# Patient Record
Sex: MALE | Race: OTHER | NOT HISPANIC OR LATINO
[De-identification: names, ages, dates, MRNs, and addresses within clinical notes are randomized per-mention and may not be internally consistent; named-entity substitution may affect disease eponyms.]

---

## 2017-01-25 ENCOUNTER — APPOINTMENT (OUTPATIENT)
Dept: NEUROLOGY | Facility: CLINIC | Age: 42
End: 2017-01-25

## 2017-01-25 VITALS
SYSTOLIC BLOOD PRESSURE: 127 MMHG | HEIGHT: 66 IN | WEIGHT: 182 LBS | BODY MASS INDEX: 29.25 KG/M2 | DIASTOLIC BLOOD PRESSURE: 79 MMHG

## 2017-01-25 DIAGNOSIS — Z80.42 FAMILY HISTORY OF MALIGNANT NEOPLASM OF PROSTATE: ICD-10-CM

## 2017-01-25 DIAGNOSIS — F17.200 NICOTINE DEPENDENCE, UNSPECIFIED, UNCOMPLICATED: ICD-10-CM

## 2017-01-25 DIAGNOSIS — H53.2 DIPLOPIA: ICD-10-CM

## 2017-01-25 DIAGNOSIS — H02.403 UNSPECIFIED PTOSIS OF BILATERAL EYELIDS: ICD-10-CM

## 2017-01-25 DIAGNOSIS — Z80.8 FAMILY HISTORY OF MALIGNANT NEOPLASM OF OTHER ORGANS OR SYSTEMS: ICD-10-CM

## 2017-01-26 PROBLEM — F17.200 CURRENT EVERY DAY SMOKER: Status: ACTIVE | Noted: 2017-01-26

## 2017-01-26 PROBLEM — Z80.42 FAMILY HISTORY OF MALIGNANT NEOPLASM OF PROSTATE: Status: ACTIVE | Noted: 2017-01-26

## 2017-01-26 PROBLEM — Z80.8 FAMILY HISTORY OF MALIGNANT NEOPLASM OF THYROID: Status: ACTIVE | Noted: 2017-01-26

## 2017-02-01 ENCOUNTER — APPOINTMENT (OUTPATIENT)
Dept: NEUROLOGY | Facility: CLINIC | Age: 42
End: 2017-02-01

## 2017-02-20 ENCOUNTER — FORM ENCOUNTER (OUTPATIENT)
Age: 42
End: 2017-02-20

## 2017-02-21 ENCOUNTER — OUTPATIENT (OUTPATIENT)
Dept: OUTPATIENT SERVICES | Facility: HOSPITAL | Age: 42
LOS: 1 days | End: 2017-02-21
Payer: COMMERCIAL

## 2017-02-21 ENCOUNTER — APPOINTMENT (OUTPATIENT)
Dept: CT IMAGING | Facility: CLINIC | Age: 42
End: 2017-02-21

## 2017-02-21 DIAGNOSIS — G70.00 MYASTHENIA GRAVIS WITHOUT (ACUTE) EXACERBATION: ICD-10-CM

## 2017-02-21 PROCEDURE — 71250 CT THORAX DX C-: CPT

## 2017-03-15 ENCOUNTER — RX RENEWAL (OUTPATIENT)
Age: 42
End: 2017-03-15

## 2017-03-15 RX ORDER — PYRIDOSTIGMINE BROMIDE 60 MG/1
60 TABLET ORAL 3 TIMES DAILY
Qty: 270 | Refills: 3 | Status: ACTIVE | COMMUNITY
Start: 2017-03-15 | End: 1900-01-01

## 2017-05-01 ENCOUNTER — APPOINTMENT (OUTPATIENT)
Dept: NEUROLOGY | Facility: CLINIC | Age: 42
End: 2017-05-01

## 2018-01-08 ENCOUNTER — EMERGENCY (EMERGENCY)
Facility: HOSPITAL | Age: 43
LOS: 1 days | Discharge: HOME | End: 2018-01-08
Attending: EMERGENCY MEDICINE
Payer: COMMERCIAL

## 2018-01-08 VITALS
RESPIRATION RATE: 20 BRPM | WEIGHT: 164.91 LBS | OXYGEN SATURATION: 100 % | HEART RATE: 79 BPM | SYSTOLIC BLOOD PRESSURE: 145 MMHG | DIASTOLIC BLOOD PRESSURE: 68 MMHG | TEMPERATURE: 98 F

## 2018-01-08 PROCEDURE — 99282 EMERGENCY DEPT VISIT SF MDM: CPT

## 2018-01-08 PROCEDURE — 99283 EMERGENCY DEPT VISIT LOW MDM: CPT

## 2018-01-08 NOTE — ED PROVIDER NOTE - MEDICAL DECISION MAKING DETAILS
41 y/o M pt presents with mild head injury. No concerns for ICH or skull fractures. Tetanus UTD. Will discharge.

## 2018-01-08 NOTE — ED PROVIDER NOTE - OBJECTIVE STATEMENT
41 y/o M pt with a significant PMHx of myasthenia gravis and no significant PSHx presents to the ED s/p mild head injury x today. Pt states he was hit from behind with a metal food card. Pt notes mild dizziness initially. Pt denies headache, extremity weakness, neck pain or any other complaints. NKDA.

## 2018-01-08 NOTE — ED PROVIDER NOTE - MUSCULOSKELETAL, MLM
Spine appears normal, range of motion is not limited, no muscle or joint tenderness. No C-spine tenderness.

## 2019-09-24 ENCOUNTER — EMERGENCY (EMERGENCY)
Facility: HOSPITAL | Age: 44
LOS: 1 days | Discharge: HOME | End: 2019-09-24
Attending: EMERGENCY MEDICINE
Payer: COMMERCIAL

## 2019-09-24 VITALS
DIASTOLIC BLOOD PRESSURE: 95 MMHG | TEMPERATURE: 97 F | RESPIRATION RATE: 18 BRPM | WEIGHT: 190.04 LBS | OXYGEN SATURATION: 99 % | SYSTOLIC BLOOD PRESSURE: 135 MMHG | HEART RATE: 75 BPM

## 2019-09-24 PROBLEM — G70.00 MYASTHENIA GRAVIS WITHOUT (ACUTE) EXACERBATION: Chronic | Status: ACTIVE | Noted: 2018-01-08

## 2019-09-24 PROCEDURE — 99283 EMERGENCY DEPT VISIT LOW MDM: CPT

## 2019-09-24 PROCEDURE — 73630 X-RAY EXAM OF FOOT: CPT

## 2019-09-24 PROCEDURE — 99283 EMERGENCY DEPT VISIT LOW MDM: CPT | Mod: 25

## 2019-09-24 PROCEDURE — 73630 X-RAY EXAM OF FOOT: CPT | Mod: 26,LT

## 2019-09-24 RX ORDER — IBUPROFEN 200 MG
600 TABLET ORAL ONCE
Refills: 0 | Status: COMPLETED | OUTPATIENT
Start: 2019-09-24 | End: 2019-09-24

## 2019-09-24 RX ADMIN — Medication 600 MILLIGRAM(S): at 14:22

## 2019-09-24 NOTE — ED ADULT NURSE NOTE - NSIMPLEMENTINTERV_GEN_ALL_ED
Implemented All Universal Safety Interventions:  [place] to call system. Call bell, personal items and telephone within reach. Instruct patient to call for assistance. Room bathroom lighting operational. Non-slip footwear when patient is off stretcher. Physically safe environment: no spills, clutter or unnecessary equipment. Stretcher in lowest position, wheels locked, appropriate side rails in place.

## 2019-09-24 NOTE — ED PROVIDER NOTE - PHYSICAL EXAMINATION
point tenderness to mid lateral right foot.  Minimal swelling, no ecchymosis point tenderness to mid lateral left foot.  Minimal swelling, no ecchymosis

## 2019-09-24 NOTE — ED PROVIDER NOTE - ATTENDING CONTRIBUTION TO CARE
seen with acp  c/o left foot pain after walking on left grate  h of myasthenia gravis  x-rays are negative  Agree [facility] acps assessment hx and physical  patient will be imobilized

## 2019-09-24 NOTE — ED PROVIDER NOTE - OBJECTIVE STATEMENT
43 year old male history of mild myasthenia gravis with traumatic right foot pain after taking a step and foot being caught in a metal grate 3 days ago.  Patient complaining of pain to proximal 5th metatarsal area of right foot.  Point tenderness noted on exam. Patient denies other injury including injury to ankle or back. Patient states he is only able to partially bear weight on foot due to pain. 43 year old male history of mild myasthenia gravis with traumatic left foot pain after taking a step and foot being caught in a metal grate 3 days ago.  Patient complaining of pain to proximal 5th metatarsal area of left foot.  Point tenderness noted on exam. Patient denies other injury including injury to ankle or back. Patient states he is only able to partially bear weight on foot due to pain.

## 2019-09-24 NOTE — ED PROVIDER NOTE - PATIENT PORTAL LINK FT
You can access the FollowMyHealth Patient Portal offered by [facility] by registering at the following website: [email or web address]. By joining [facility]’s FollowMyHealth portal, you will also be able to view your health information using other applications (apps) compatible with our system.

## 2019-09-24 NOTE — ED PROVIDER NOTE - CLINICAL SUMMARY MEDICAL DECISION MAKING FREE TEXT BOX
43 year old male history of mild myasthenia gravis with traumatic right foot pain.  IBU for pain relief, will XR to rule out fracture. 43 year old male history of mild myasthenia gravis with traumatic left foot pain.  IBU for pain relief, will XR to rule out fracture.

## 2019-09-24 NOTE — ED ADULT NURSE NOTE - OBJECTIVE STATEMENT
States his left foot got caught in metal grate while at work 3 days ago , since then with worsening pain to left foot.

## 2019-09-24 NOTE — ED PROVIDER NOTE - PROGRESS NOTE DETAILS
XR negative for acute fracture at point of tenderness.  Ortho shoe provided, patient to take OTC IBU and follow up with podiatry in 1 week if not better.

## 2020-04-26 ENCOUNTER — MESSAGE (OUTPATIENT)
Age: 45
End: 2020-04-26

## 2021-11-08 ENCOUNTER — APPOINTMENT (OUTPATIENT)
Dept: NEUROLOGY | Facility: CLINIC | Age: 46
End: 2021-11-08
Payer: COMMERCIAL

## 2021-11-08 VITALS
DIASTOLIC BLOOD PRESSURE: 79 MMHG | BODY MASS INDEX: 27.97 KG/M2 | WEIGHT: 174 LBS | HEIGHT: 66 IN | HEART RATE: 73 BPM | SYSTOLIC BLOOD PRESSURE: 124 MMHG

## 2021-11-08 DIAGNOSIS — Z78.9 OTHER SPECIFIED HEALTH STATUS: ICD-10-CM

## 2021-11-08 PROCEDURE — 99203 OFFICE O/P NEW LOW 30 MIN: CPT

## 2021-11-08 RX ORDER — PYRIDOSTIGMINE BROMIDE 60 MG/1
60 TABLET ORAL
Qty: 120 | Refills: 5 | Status: ACTIVE | COMMUNITY
Start: 2021-11-08 | End: 1900-01-01

## 2021-11-09 PROBLEM — Z78.9 KNOWN HEALTH PROBLEMS: NONE: Status: RESOLVED | Noted: 2021-11-09 | Resolved: 2021-11-09

## 2021-11-09 NOTE — HISTORY OF PRESENT ILLNESS
[FreeTextEntry1] : Mr. 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  He was essentially asymptomatic until he had a Covid vaccination on 8/24/2021 and approximately 3 weeks following the vaccination that is around mid-September he had recurrence of myasthenia with diplopia in the left lateral gaze without any ptosis dysarthria or dysphagia or dyspnea focal or lateralized weakness but also stated that he has right hand locking sensation since approximately 1 year and when he gets tired and resting improves him.  A detailed review of systems is unremarkable.  He has no post vaccination complications other than the aforementioned history.\par \par There is no pertinent past medical history is employed by [email or web address] and there is no change in his family or personal history.  He is currently not taking any medications.\par \par Review of systems is unremarkable.  General examination is unremarkable.  His vital signs are normal

## 2021-11-09 NOTE — DISCUSSION/SUMMARY
[FreeTextEntry1] : Opinion–the patient has ocular myasthenia gravis and the first episode occurred following influenza vaccine the same day and now after an asymptomatic.  Of approximately 4 years he had Covid vaccination and 3 weeks later he experienced diplopia and on objective evaluation he has left lateral rectus weakness without ptosis or any other neurological findings.  He was therefore advised to restart Mestinon and a prescription was sent to his pharmacy including delineation of the side effects.  During his last evaluation there was a small lymph node-like swelling in the anterior mediastinum and I advised him to have a CT scan of the chest to be compared with the previous chest radiograph and a prescription was provided.  He was advised that if there are any other symptoms such as ptosis persistent other diplopia's dysarthria or dysphagia or dyspnea neck muscle weakness focal or lateralized weakness to contact my office immediately.  Extensive education and counseling was provided in the presence of my neurophysiology fellow who explained the neurological occurrence of diplopia following Covid vaccine which is probably an immunological manifestation but should subside with treatment and a careful follow-up was advised in approximately 1 month and by phone.  He understands and will proceed with my advice.

## 2021-11-09 NOTE — PHYSICAL EXAM
[FreeTextEntry1] : General examination is unremarkable.  Head neck, ear nose and throat is unremarkable.  Neck is supple with full range of motion.  Lumbar spine range of motion is normal.  Pedal pulsations are normal.  There are no meningeal signs.  There is no significant lymphedema.\par \par Neurological examination reveals mild left lateral rectus weakness with appropriate diplopia without any ptosis or any other bulbar dysfunction and the entire neurological evaluation is normal as delineated. [General Appearance - Alert] : alert [General Appearance - In No Acute Distress] : in no acute distress [Oriented To Time, Place, And Person] : oriented to person, place, and time [Impaired Insight] : insight and judgment were intact [Affect] : the affect was normal [Person] : oriented to person [Place] : oriented to place [Time] : oriented to time [Concentration Intact] : normal concentrating ability [Visual Intact] : visual attention was ~T not ~L decreased [Naming Objects] : no difficulty naming common objects [Repeating Phrases] : no difficulty repeating a phrase [Writing A Sentence] : no difficulty writing a sentence [Fluency] : fluency intact [Comprehension] : comprehension intact [Reading] : reading intact [Past History] : adequate knowledge of personal past history [Cranial Nerves Optic (II)] : visual acuity intact bilaterally,  visual fields full to confrontation, pupils equal round and reactive to light [Cranial Nerves Oculomotor (III)] : extraocular motion intact [Cranial Nerves Trigeminal (V)] : facial sensation intact symmetrically [Cranial Nerves Facial (VII)] : face symmetrical [Cranial Nerves Vestibulocochlear (VIII)] : hearing was intact bilaterally [Cranial Nerves Glossopharyngeal (IX)] : tongue and palate midline [Cranial Nerves Accessory (XI - Cranial And Spinal)] : head turning and shoulder shrug symmetric [Cranial Nerves Hypoglossal (XII)] : there was no tongue deviation with protrusion [Motor Tone] : muscle tone was normal in all four extremities [Motor Strength] : muscle strength was normal in all four extremities [No Muscle Atrophy] : normal bulk in all four extremities [Sensation Tactile Decrease] : light touch was intact [Abnormal Walk] : normal gait [Balance] : balance was intact [Past-pointing] : there was no past-pointing [Tremor] : no tremor present [2+] : Ankle jerk left 2+ [Plantar Reflex Right Only] : normal on the right [Plantar Reflex Left Only] : normal on the left

## 2022-01-26 ENCOUNTER — OUTPATIENT (OUTPATIENT)
Dept: OUTPATIENT SERVICES | Facility: HOSPITAL | Age: 47
LOS: 1 days | End: 2022-01-26
Payer: COMMERCIAL

## 2022-01-26 ENCOUNTER — APPOINTMENT (OUTPATIENT)
Dept: CT IMAGING | Facility: CLINIC | Age: 47
End: 2022-01-26
Payer: COMMERCIAL

## 2022-01-26 ENCOUNTER — APPOINTMENT (OUTPATIENT)
Dept: NEUROLOGY | Facility: CLINIC | Age: 47
End: 2022-01-26
Payer: COMMERCIAL

## 2022-01-26 VITALS
WEIGHT: 166 LBS | HEART RATE: 64 BPM | BODY MASS INDEX: 26.68 KG/M2 | SYSTOLIC BLOOD PRESSURE: 123 MMHG | DIASTOLIC BLOOD PRESSURE: 79 MMHG | HEIGHT: 66 IN

## 2022-01-26 DIAGNOSIS — G70.00 MYASTHENIA GRAVIS W/OUT (ACUTE) EXACERBATION: ICD-10-CM

## 2022-01-26 DIAGNOSIS — G70.00 MYASTHENIA GRAVIS WITHOUT (ACUTE) EXACERBATION: ICD-10-CM

## 2022-01-26 PROCEDURE — 71250 CT THORAX DX C-: CPT

## 2022-01-26 PROCEDURE — 71250 CT THORAX DX C-: CPT | Mod: 26

## 2022-01-26 PROCEDURE — 99213 OFFICE O/P EST LOW 20 MIN: CPT

## 2022-01-27 PROBLEM — G70.00 MYASTHENIA GRAVIS: Status: ACTIVE | Noted: 2017-01-25

## 2022-01-27 NOTE — DATA REVIEWED
[de-identified] : I reviewed the CT scan of the chest with the patient and advised him that it is negative for any thymic lesion.  General examination is unremarkable

## 2022-01-27 NOTE — DISCUSSION/SUMMARY
[FreeTextEntry1] : Opinion–the patient is neurologically asymptomatic and has a completely normal neurological examination and was advised to continue Mestinon and return back for follow-up in 6 months or on a as needed basis.  All possible risks complications associated with systemic illness fever vaccination drug interaction with myasthenia gravis was discussed and to contact me if there are any issues and maintain good health.

## 2022-01-27 NOTE — PHYSICAL EXAM
[FreeTextEntry1] : General examination is unremarkable.  Head neck, ear nose and throat is unremarkable.  There is no carotid bruit thyromegaly or lymphadenopathy.  Chest is clear heart sounds are normal.  Temporal artery pulsations are normal and there is no sinus tenderness.\par \par Neurological examination is completely normal as delineated.  Opinion– [General Appearance - Alert] : alert [General Appearance - In No Acute Distress] : in no acute distress [Oriented To Time, Place, And Person] : oriented to person, place, and time [Impaired Insight] : insight and judgment were intact [Affect] : the affect was normal [Person] : oriented to person [Place] : oriented to place [Time] : oriented to time [Concentration Intact] : normal concentrating ability [Visual Intact] : visual attention was ~T not ~L decreased [Naming Objects] : no difficulty naming common objects [Repeating Phrases] : no difficulty repeating a phrase [Writing A Sentence] : no difficulty writing a sentence [Fluency] : fluency intact [Comprehension] : comprehension intact [Reading] : reading intact [Past History] : adequate knowledge of personal past history [Cranial Nerves Optic (II)] : visual acuity intact bilaterally,  visual fields full to confrontation, pupils equal round and reactive to light [Cranial Nerves Oculomotor (III)] : extraocular motion intact [Cranial Nerves Trigeminal (V)] : facial sensation intact symmetrically [Cranial Nerves Facial (VII)] : face symmetrical [Cranial Nerves Vestibulocochlear (VIII)] : hearing was intact bilaterally [Cranial Nerves Glossopharyngeal (IX)] : tongue and palate midline [Cranial Nerves Accessory (XI - Cranial And Spinal)] : head turning and shoulder shrug symmetric [Cranial Nerves Hypoglossal (XII)] : there was no tongue deviation with protrusion [Motor Tone] : muscle tone was normal in all four extremities [Motor Strength] : muscle strength was normal in all four extremities [No Muscle Atrophy] : normal bulk in all four extremities [Sensation Tactile Decrease] : light touch was intact [Abnormal Walk] : normal gait [Balance] : balance was intact [Past-pointing] : there was no past-pointing [Tremor] : no tremor present [2+] : Ankle jerk left 2+ [Plantar Reflex Right Only] : normal on the right [Plantar Reflex Left Only] : normal on the left

## 2022-01-27 NOTE — HISTORY OF PRESENT ILLNESS
[FreeTextEntry1] : Mr. Bledsoe is a 46 old male being followed for ocular myasthenia gravis which is asymptomatic on Mestinon 60 mg twice daily and is currently entirely asymptomatic and denies any diplopia dysarthria dysphagia or dyspnea ptosis neck muscle weakness focal or lateralized weakness.\par \par He is here to discuss the CT scan of the chest and was advised that it is negative for any thymic lesions.\par \par Review of systems is unremarkable and there is no interim medical history.  He has no toxic habits employed single and can carry out all activities of daily.  I reviewed his medications and allergies.

## 2022-07-27 RX ORDER — PYRIDOSTIGMINE BROMIDE 60 MG/1
60 TABLET ORAL 4 TIMES DAILY
Qty: 360 | Refills: 1 | Status: ACTIVE | COMMUNITY
Start: 2022-07-27 | End: 1900-01-01

## 2022-07-29 ENCOUNTER — APPOINTMENT (OUTPATIENT)
Dept: NEUROLOGY | Facility: CLINIC | Age: 47
End: 2022-07-29

## 2025-03-19 NOTE — ED ADULT NURSE NOTE - NS ED NOTE ABUSE RESPONSE YN
Copied from CRM #[id number]. Topic: MW Messaging - MW Patient Request  >> Mar 19, 2025  3:04 PM Rina MONDRAGON wrote:  Gillian Leal called requesting to send a general message to clinician.   Verified issue is NOT regarding a symptom(s) requiring routine or emergent triage. Verified another message template type and CRM does not apply.    Selected 'Wrap Up CRM' and created new Telephone Encounter after clicking 'Convert to Clinical Call'. Selected appropriate Reason for Call.  Sent Pt message template and routed as routine priority per Clinician KB page to appropriate clinician pool. Readback full message.    -- DO NOT REPLY / DO NOT REPLY ALL --  -- This inbox is not monitored. If this was sent to the wrong provider or department, reroute message to P ECO Reroute pool. --  -- Message is from Engagement Center Operations (ECO) --    General Patient Message: patient following up with Lamberto Perera MD or ANUSHA Rodriguez to inform how neurology appt with Karen Dias MD went please call patient to discuss further    Caller Information       Contact Date/Time Type Contact Phone/Fax    03/19/2025 03:03 PM CDT Phone (Incoming) Gillian Leal [phone]            Alternative phone number: [phone]     Can a detailed message be left? Yes - [employer] Message  and Yes - Voicemail   Patient has been advised the message will be addressed within 2-3 business days.                 Yes